# Patient Record
Sex: FEMALE | Race: BLACK OR AFRICAN AMERICAN | NOT HISPANIC OR LATINO | URBAN - METROPOLITAN AREA
[De-identification: names, ages, dates, MRNs, and addresses within clinical notes are randomized per-mention and may not be internally consistent; named-entity substitution may affect disease eponyms.]

---

## 2017-08-07 ENCOUNTER — EMERGENCY (EMERGENCY)
Facility: HOSPITAL | Age: 22
LOS: 1 days | Discharge: HOME | End: 2017-08-07
Attending: EMERGENCY MEDICINE
Payer: COMMERCIAL

## 2017-08-07 VITALS
DIASTOLIC BLOOD PRESSURE: 78 MMHG | OXYGEN SATURATION: 99 % | TEMPERATURE: 99 F | HEIGHT: 65 IN | WEIGHT: 139.99 LBS | RESPIRATION RATE: 20 BRPM | SYSTOLIC BLOOD PRESSURE: 128 MMHG | HEART RATE: 73 BPM

## 2017-08-07 PROCEDURE — 99283 EMERGENCY DEPT VISIT LOW MDM: CPT

## 2017-08-07 PROCEDURE — 72040 X-RAY EXAM NECK SPINE 2-3 VW: CPT | Mod: 26

## 2017-08-07 PROCEDURE — 72040 X-RAY EXAM NECK SPINE 2-3 VW: CPT

## 2017-08-07 PROCEDURE — 99283 EMERGENCY DEPT VISIT LOW MDM: CPT | Mod: 25

## 2017-08-07 RX ORDER — IBUPROFEN 200 MG
1 TABLET ORAL
Qty: 15 | Refills: 0
Start: 2017-08-07 | End: 2017-08-12

## 2017-08-07 RX ORDER — IBUPROFEN 200 MG
800 TABLET ORAL ONCE
Qty: 0 | Refills: 0 | Status: COMPLETED | OUTPATIENT
Start: 2017-08-07 | End: 2017-08-07

## 2017-08-07 RX ORDER — METHOCARBAMOL 500 MG/1
1 TABLET, FILM COATED ORAL
Qty: 15 | Refills: 0
Start: 2017-08-07 | End: 2017-08-12

## 2017-08-07 RX ADMIN — Medication 800 MILLIGRAM(S): at 10:32
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
## 2017-08-07 NOTE — ED STATDOCS - ATTENDING CONTRIBUTION TO CARE
I, Benito Groves, performed the initial face to face bedside interview with this patient regarding history of present illness, review of symptoms and relevant past medical, social and family history.  I completed an independent physical examination.  I was the provider who initially evaluated this patient.  The history, relevant review of systems, past medical and surgical history, medical decision making, and physical examination was documented by the scribe in my presence and I attest to the accuracy of the documentation. Follow-up on ordered tests (ie labs, radiologic studies) and re-evaluation of the patient's status has been communicated to the ACP.  Disposition of the patient will be based on test outcome and response to ED interventions.

## 2017-08-07 NOTE — ED STATDOCS - MUSCULOSKELETAL, MLM
C4-C5 midline tenderness, left trapezius tenderness. No T spine, L spine  midline tenderness. no para thoracic or para lumbar tenderness, no CVA tenderness. b/l UE no bony tenderness, no localized tenderness. b/l LE no bony tenderness, no localized tenderness.
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
## 2018-06-10 ENCOUNTER — TRANSCRIPTION ENCOUNTER (OUTPATIENT)
Age: 23
End: 2018-06-10

## 2019-10-15 PROBLEM — Z00.00 ENCOUNTER FOR PREVENTIVE HEALTH EXAMINATION: Status: ACTIVE | Noted: 2019-10-15

## 2019-11-25 ENCOUNTER — APPOINTMENT (OUTPATIENT)
Dept: OBGYN | Facility: CLINIC | Age: 24
End: 2019-11-25
Payer: OTHER GOVERNMENT

## 2019-11-25 ENCOUNTER — MED ADMIN CHARGE (OUTPATIENT)
Age: 24
End: 2019-11-25

## 2019-11-25 VITALS — WEIGHT: 184 LBS | DIASTOLIC BLOOD PRESSURE: 68 MMHG | SYSTOLIC BLOOD PRESSURE: 102 MMHG

## 2019-11-25 PROCEDURE — 0501F PRENATAL FLOW SHEET: CPT

## 2019-11-25 PROCEDURE — 90471 IMMUNIZATION ADMIN: CPT

## 2019-11-25 PROCEDURE — 90686 IIV4 VACC NO PRSV 0.5 ML IM: CPT

## 2019-12-04 ENCOUNTER — LABORATORY RESULT (OUTPATIENT)
Age: 24
End: 2019-12-04

## 2019-12-05 ENCOUNTER — APPOINTMENT (OUTPATIENT)
Dept: OBGYN | Facility: CLINIC | Age: 24
End: 2019-12-05
Payer: OTHER GOVERNMENT

## 2019-12-05 ENCOUNTER — APPOINTMENT (OUTPATIENT)
Dept: ANTEPARTUM | Facility: CLINIC | Age: 24
End: 2019-12-05
Payer: OTHER GOVERNMENT

## 2019-12-05 ENCOUNTER — ASOB RESULT (OUTPATIENT)
Age: 24
End: 2019-12-05

## 2019-12-05 VITALS
SYSTOLIC BLOOD PRESSURE: 113 MMHG | WEIGHT: 183 LBS | HEIGHT: 65 IN | BODY MASS INDEX: 30.49 KG/M2 | DIASTOLIC BLOOD PRESSURE: 73 MMHG

## 2019-12-05 PROCEDURE — 76819 FETAL BIOPHYS PROFIL W/O NST: CPT

## 2019-12-05 PROCEDURE — 76816 OB US FOLLOW-UP PER FETUS: CPT

## 2019-12-05 PROCEDURE — 0502F SUBSEQUENT PRENATAL CARE: CPT

## 2019-12-13 ENCOUNTER — APPOINTMENT (OUTPATIENT)
Dept: OBGYN | Facility: CLINIC | Age: 24
End: 2019-12-13
Payer: OTHER GOVERNMENT

## 2019-12-13 ENCOUNTER — TRANSCRIPTION ENCOUNTER (OUTPATIENT)
Age: 24
End: 2019-12-13

## 2019-12-13 VITALS
DIASTOLIC BLOOD PRESSURE: 74 MMHG | HEIGHT: 65 IN | SYSTOLIC BLOOD PRESSURE: 111 MMHG | BODY MASS INDEX: 30.99 KG/M2 | WEIGHT: 186 LBS

## 2019-12-13 PROCEDURE — 0502F SUBSEQUENT PRENATAL CARE: CPT

## 2019-12-14 ENCOUNTER — LABORATORY RESULT (OUTPATIENT)
Age: 24
End: 2019-12-14

## 2019-12-18 ENCOUNTER — APPOINTMENT (OUTPATIENT)
Dept: OBGYN | Facility: CLINIC | Age: 24
End: 2019-12-18
Payer: OTHER GOVERNMENT

## 2019-12-18 VITALS
DIASTOLIC BLOOD PRESSURE: 70 MMHG | BODY MASS INDEX: 31.65 KG/M2 | WEIGHT: 190 LBS | SYSTOLIC BLOOD PRESSURE: 110 MMHG | HEIGHT: 65 IN

## 2019-12-18 PROCEDURE — 0502F SUBSEQUENT PRENATAL CARE: CPT

## 2019-12-26 ENCOUNTER — APPOINTMENT (OUTPATIENT)
Dept: OBGYN | Facility: CLINIC | Age: 24
End: 2019-12-26
Payer: OTHER GOVERNMENT

## 2019-12-26 VITALS
SYSTOLIC BLOOD PRESSURE: 110 MMHG | HEIGHT: 65 IN | DIASTOLIC BLOOD PRESSURE: 70 MMHG | WEIGHT: 192 LBS | BODY MASS INDEX: 31.99 KG/M2

## 2019-12-26 PROCEDURE — 0502F SUBSEQUENT PRENATAL CARE: CPT

## 2019-12-26 PROCEDURE — 59425 ANTEPARTUM CARE ONLY: CPT

## 2019-12-30 ENCOUNTER — TRANSCRIPTION ENCOUNTER (OUTPATIENT)
Age: 24
End: 2019-12-30

## 2020-01-01 ENCOUNTER — INPATIENT (INPATIENT)
Facility: HOSPITAL | Age: 25
LOS: 2 days | Discharge: HOME | End: 2020-01-04
Payer: OTHER GOVERNMENT

## 2020-01-01 VITALS — HEART RATE: 82 BPM | DIASTOLIC BLOOD PRESSURE: 66 MMHG | TEMPERATURE: 98 F | SYSTOLIC BLOOD PRESSURE: 123 MMHG

## 2020-01-01 DIAGNOSIS — O26.893 OTHER SPECIFIED PREGNANCY RELATED CONDITIONS, THIRD TRIMESTER: ICD-10-CM

## 2020-01-01 DIAGNOSIS — Z3A.39 39 WEEKS GESTATION OF PREGNANCY: ICD-10-CM

## 2020-01-01 LAB
ABO RH CONFIRMATION: SIGNIFICANT CHANGE UP
APPEARANCE UR: CLEAR — SIGNIFICANT CHANGE UP
BACTERIA # UR AUTO: ABNORMAL
BASOPHILS # BLD AUTO: 0.02 K/UL — SIGNIFICANT CHANGE UP (ref 0–0.2)
BASOPHILS NFR BLD AUTO: 0.2 % — SIGNIFICANT CHANGE UP (ref 0–2)
BILIRUB UR-MCNC: NEGATIVE — SIGNIFICANT CHANGE UP
BLD GP AB SCN SERPL QL: SIGNIFICANT CHANGE UP
COLOR SPEC: YELLOW — SIGNIFICANT CHANGE UP
DIFF PNL FLD: NEGATIVE — SIGNIFICANT CHANGE UP
EOSINOPHIL # BLD AUTO: 0.1 K/UL — SIGNIFICANT CHANGE UP (ref 0–0.5)
EOSINOPHIL NFR BLD AUTO: 1.2 % — SIGNIFICANT CHANGE UP (ref 0–6)
EPI CELLS # UR: SIGNIFICANT CHANGE UP
GLUCOSE UR QL: NEGATIVE MG/DL — SIGNIFICANT CHANGE UP
HCT VFR BLD CALC: 31.4 % — LOW (ref 34.5–45)
HGB BLD-MCNC: 10.5 G/DL — LOW (ref 11.5–15.5)
IMM GRANULOCYTES NFR BLD AUTO: 0.5 % — SIGNIFICANT CHANGE UP (ref 0–1.5)
KETONES UR-MCNC: NEGATIVE — SIGNIFICANT CHANGE UP
LEUKOCYTE ESTERASE UR-ACNC: NEGATIVE — SIGNIFICANT CHANGE UP
LYMPHOCYTES # BLD AUTO: 1.31 K/UL — SIGNIFICANT CHANGE UP (ref 1–3.3)
LYMPHOCYTES # BLD AUTO: 15.7 % — SIGNIFICANT CHANGE UP (ref 13–44)
MCHC RBC-ENTMCNC: 30.7 PG — SIGNIFICANT CHANGE UP (ref 27–34)
MCHC RBC-ENTMCNC: 33.4 GM/DL — SIGNIFICANT CHANGE UP (ref 32–36)
MCV RBC AUTO: 91.8 FL — SIGNIFICANT CHANGE UP (ref 80–100)
MONOCYTES # BLD AUTO: 0.87 K/UL — SIGNIFICANT CHANGE UP (ref 0–0.9)
MONOCYTES NFR BLD AUTO: 10.4 % — SIGNIFICANT CHANGE UP (ref 2–14)
NEUTROPHILS # BLD AUTO: 6.03 K/UL — SIGNIFICANT CHANGE UP (ref 1.8–7.4)
NEUTROPHILS NFR BLD AUTO: 72 % — SIGNIFICANT CHANGE UP (ref 43–77)
NITRITE UR-MCNC: NEGATIVE — SIGNIFICANT CHANGE UP
PH UR: 7 — SIGNIFICANT CHANGE UP (ref 5–8)
PLATELET # BLD AUTO: 225 K/UL — SIGNIFICANT CHANGE UP (ref 150–400)
PROT UR-MCNC: 15 MG/DL
RBC # BLD: 3.42 M/UL — LOW (ref 3.8–5.2)
RBC # FLD: 12.7 % — SIGNIFICANT CHANGE UP (ref 10.3–14.5)
RBC CASTS # UR COMP ASSIST: NEGATIVE /HPF — SIGNIFICANT CHANGE UP (ref 0–4)
SP GR SPEC: 1.01 — SIGNIFICANT CHANGE UP (ref 1.01–1.02)
UROBILINOGEN FLD QL: NEGATIVE MG/DL — SIGNIFICANT CHANGE UP
WBC # BLD: 8.37 K/UL — SIGNIFICANT CHANGE UP (ref 3.8–10.5)
WBC # FLD AUTO: 8.37 K/UL — SIGNIFICANT CHANGE UP (ref 3.8–10.5)
WBC UR QL: SIGNIFICANT CHANGE UP

## 2020-01-01 RX ORDER — SODIUM CHLORIDE 9 MG/ML
1000 INJECTION, SOLUTION INTRAVENOUS
Refills: 0 | Status: DISCONTINUED | OUTPATIENT
Start: 2020-01-01 | End: 2020-01-02

## 2020-01-01 RX ORDER — OXYTOCIN 10 UNIT/ML
333.33 VIAL (ML) INJECTION
Qty: 20 | Refills: 0 | Status: COMPLETED | OUTPATIENT
Start: 2020-01-01 | End: 2020-01-02

## 2020-01-01 RX ORDER — CITRIC ACID/SODIUM CITRATE 300-500 MG
30 SOLUTION, ORAL ORAL ONCE
Refills: 0 | Status: COMPLETED | OUTPATIENT
Start: 2020-01-01 | End: 2020-01-02

## 2020-01-01 RX ADMIN — SODIUM CHLORIDE 125 MILLILITER(S): 9 INJECTION, SOLUTION INTRAVENOUS at 20:56

## 2020-01-01 NOTE — OB RN PATIENT PROFILE - IF RESULT GREATER THAN 35 DAYS OLD SELECT STATUS
"Encounter Date: 2/15/2018    ED Physician Progress Notes        Physician Note:   I evaluated the patient in triage and performed medical screening exam. Patient stable at this time and awaiting bed. Chief complaint and vital signs reviewed.    Pt hx no signficant medical hx and presents to the ED with two weeks of intermittent "chest discomfort."  Reports pain to be dull, squeezing sensation.  States when this happens, he has palpitations and shortness of breath.  Denies recent travel, surgery, previous blood clot, lower extremity edema, or exogenous hormones.  Denies drug use.  No CAD risk factors.  Will obtain basic labs, TSH, EKG, and chest xray.  Will send to Main ED for further eval.     " N/A

## 2020-01-01 NOTE — OB PROVIDER IHI INDUCTION/AUGMENTATION NOTE - NS_CHECKALL_OBGYN_ALL_OB
Order was written/Induction / Augmentation was discussed/FHR was reviewed/H&P was completed/Contractions pattern was reviewed

## 2020-01-01 NOTE — OB PROVIDER H&P - ASSESSMENT
23yo  at 39 6/7 weeks gestation admitted for eIOL    - Admit to L&D  - Admission labs sent  - Will begin induction with cytotec    Discussed with Dr. Arambula

## 2020-01-01 NOTE — OB PROVIDER H&P - HISTORY OF PRESENT ILLNESS
Patient is a 23yo  at 39 6/7 weeks gestation presenting to L&D for [facility]    Patient reports occasional contractions and good fetal movement. She denies vaginal bleeding and leakage of fluid.    This pregnancy has been uncomplicated    OBHx: denies  PMH: denies  PSH: denies  Meds: PNV, anemia, vitamin E  All: NKDA

## 2020-01-01 NOTE — OB PROVIDER H&P - NSHPPHYSICALEXAM_GEN_ALL_CORE
Vital Signs Last 24 Hrs  T(C): 36.4 (01 Jan 2020 20:38), Max: 36.4 (01 Jan 2020 20:38)  T(F): 97.52 (01 Jan 2020 20:38), Max: 97.52 (01 Jan 2020 20:38)  HR: 82 (01 Jan 2020 20:38) (82 - 82)  BP: 123/66 (01 Jan 2020 20:38) (123/66 - 123/66)      SVE: 1-2/60/-3  [place]: cephalic  FHT: baseline 145, moderate variability, + accels, - decels  [place]: occasional contractions

## 2020-01-02 ENCOUNTER — TRANSCRIPTION ENCOUNTER (OUTPATIENT)
Age: 25
End: 2020-01-02

## 2020-01-02 LAB — T PALLIDUM AB TITR SER: NEGATIVE — SIGNIFICANT CHANGE UP

## 2020-01-02 PROCEDURE — 59515 CESAREAN DELIVERY: CPT

## 2020-01-02 RX ORDER — OXYTOCIN 10 UNIT/ML
2 VIAL (ML) INJECTION
Qty: 30 | Refills: 0 | Status: DISCONTINUED | OUTPATIENT
Start: 2020-01-02 | End: 2020-01-04

## 2020-01-02 RX ORDER — OXYTOCIN 10 UNIT/ML
41.67 VIAL (ML) INJECTION
Qty: 20 | Refills: 0 | Status: COMPLETED | OUTPATIENT
Start: 2020-01-02 | End: 2020-01-02

## 2020-01-02 RX ORDER — SIMETHICONE 80 MG/1
80 TABLET, CHEWABLE ORAL EVERY 4 HOURS
Refills: 0 | Status: DISCONTINUED | OUTPATIENT
Start: 2020-01-02 | End: 2020-01-04

## 2020-01-02 RX ORDER — MAGNESIUM HYDROXIDE 400 MG/1
30 TABLET, CHEWABLE ORAL
Refills: 0 | Status: DISCONTINUED | OUTPATIENT
Start: 2020-01-02 | End: 2020-01-04

## 2020-01-02 RX ORDER — SODIUM CHLORIDE 9 MG/ML
1000 INJECTION, SOLUTION INTRAVENOUS
Refills: 0 | Status: DISCONTINUED | OUTPATIENT
Start: 2020-01-02 | End: 2020-01-04

## 2020-01-02 RX ORDER — GLYCERIN ADULT
1 SUPPOSITORY, RECTAL RECTAL AT BEDTIME
Refills: 0 | Status: DISCONTINUED | OUTPATIENT
Start: 2020-01-02 | End: 2020-01-04

## 2020-01-02 RX ORDER — ACETAMINOPHEN 500 MG
975 TABLET ORAL
Refills: 0 | Status: DISCONTINUED | OUTPATIENT
Start: 2020-01-02 | End: 2020-01-04

## 2020-01-02 RX ORDER — DIPHENHYDRAMINE HCL 50 MG
25 CAPSULE ORAL EVERY 6 HOURS
Refills: 0 | Status: DISCONTINUED | OUTPATIENT
Start: 2020-01-02 | End: 2020-01-04

## 2020-01-02 RX ORDER — SODIUM CHLORIDE 9 MG/ML
3 INJECTION INTRAMUSCULAR; INTRAVENOUS; SUBCUTANEOUS EVERY 8 HOURS
Refills: 0 | Status: DISCONTINUED | OUTPATIENT
Start: 2020-01-02 | End: 2020-01-04

## 2020-01-02 RX ORDER — KETOROLAC TROMETHAMINE 30 MG/ML
30 SYRINGE (ML) INJECTION ONCE
Refills: 0 | Status: DISCONTINUED | OUTPATIENT
Start: 2020-01-02 | End: 2020-01-04

## 2020-01-02 RX ORDER — OXYCODONE HYDROCHLORIDE 5 MG/1
5 TABLET ORAL
Refills: 0 | Status: DISCONTINUED | OUTPATIENT
Start: 2020-01-02 | End: 2020-01-04

## 2020-01-02 RX ORDER — LANOLIN
1 OINTMENT (GRAM) TOPICAL EVERY 6 HOURS
Refills: 0 | Status: DISCONTINUED | OUTPATIENT
Start: 2020-01-02 | End: 2020-01-04

## 2020-01-02 RX ORDER — HYDROCORTISONE 1 %
1 OINTMENT (GRAM) TOPICAL EVERY 6 HOURS
Refills: 0 | Status: DISCONTINUED | OUTPATIENT
Start: 2020-01-02 | End: 2020-01-04

## 2020-01-02 RX ORDER — TETANUS TOXOID, REDUCED DIPHTHERIA TOXOID AND ACELLULAR PERTUSSIS VACCINE, ADSORBED 5; 2.5; 8; 8; 2.5 [IU]/.5ML; [IU]/.5ML; UG/.5ML; UG/.5ML; UG/.5ML
0.5 SUSPENSION INTRAMUSCULAR ONCE
Refills: 0 | Status: DISCONTINUED | OUTPATIENT
Start: 2020-01-02 | End: 2020-01-04

## 2020-01-02 RX ORDER — OXYCODONE HYDROCHLORIDE 5 MG/1
5 TABLET ORAL ONCE
Refills: 0 | Status: DISCONTINUED | OUTPATIENT
Start: 2020-01-02 | End: 2020-01-04

## 2020-01-02 RX ORDER — DIBUCAINE 1 %
1 OINTMENT (GRAM) RECTAL EVERY 6 HOURS
Refills: 0 | Status: DISCONTINUED | OUTPATIENT
Start: 2020-01-02 | End: 2020-01-04

## 2020-01-02 RX ORDER — PRAMOXINE HYDROCHLORIDE 150 MG/15G
1 AEROSOL, FOAM RECTAL EVERY 4 HOURS
Refills: 0 | Status: DISCONTINUED | OUTPATIENT
Start: 2020-01-02 | End: 2020-01-04

## 2020-01-02 RX ORDER — IBUPROFEN 200 MG
600 TABLET ORAL EVERY 6 HOURS
Refills: 0 | Status: COMPLETED | OUTPATIENT
Start: 2020-01-02 | End: 2020-11-30

## 2020-01-02 RX ORDER — BENZOCAINE 10 %
1 GEL (GRAM) MUCOUS MEMBRANE EVERY 6 HOURS
Refills: 0 | Status: DISCONTINUED | OUTPATIENT
Start: 2020-01-02 | End: 2020-01-04

## 2020-01-02 RX ORDER — AER TRAVELER 0.5 G/1
1 SOLUTION RECTAL; TOPICAL EVERY 4 HOURS
Refills: 0 | Status: DISCONTINUED | OUTPATIENT
Start: 2020-01-02 | End: 2020-01-04

## 2020-01-02 RX ADMIN — Medication 1000 MILLIUNIT(S)/MIN: at 20:10

## 2020-01-02 RX ADMIN — Medication 125 MILLIUNIT(S)/MIN: at 20:10

## 2020-01-02 RX ADMIN — SODIUM CHLORIDE 125 MILLILITER(S): 9 INJECTION, SOLUTION INTRAVENOUS at 16:03

## 2020-01-02 RX ADMIN — SODIUM CHLORIDE 125 MILLILITER(S): 9 INJECTION, SOLUTION INTRAVENOUS at 08:25

## 2020-01-02 RX ADMIN — Medication 30 MILLILITER(S): at 14:08

## 2020-01-02 RX ADMIN — Medication 2 MILLIUNIT(S)/MIN: at 17:03
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
## 2020-01-02 NOTE — CHART NOTE - NSCHARTNOTEFT_GEN_A_CORE
Patient comfortably resting with epidural. Currently on d5LR because was       Vital Signs Last 24 Hrs  T(C): 36.6 (02 Jan 2020 09:27), Max: 36.6 (02 Jan 2020 05:02)  T(F): 97.88 (02 Jan 2020 09:27), Max: 97.88 (02 Jan 2020 05:02)  HR: 96 (02 Jan 2020 16:12) (68 - 96)  BP: 97/59 (02 Jan 2020 16:02) (92/52 - 123/66)  BP(mean): --  RR: 18 (02 Jan 2020 00:22) (16 - 18)  SpO2: 100% (02 Jan 2020 16:07) (99% - 100%)    FETAL HEART RATE:145/mod evy/ +accels/ evy decel  [place]:q 2-6 min    CERVICAL EXAM: 6/100/-1      IMPRESSION: making good cervical change. FHT improved with d5, cat II will continue to monitor. Will start pitocin for increased regularity of contractions.     discussed with Dr. Ureña

## 2020-01-02 NOTE — CHART NOTE - NSCHARTNOTEFT_GEN_A_CORE
Patient resting comfortably. She declines pain management at this time.    Vital Signs Last 24 Hrs  T(C): 36.5 (02 Jan 2020 00:59), Max: 36.5 (02 Jan 2020 00:22)  T(F): 97.7 (02 Jan 2020 00:59), Max: 97.7 (02 Jan 2020 00:22)  HR: 83 (02 Jan 2020 03:03) (82 - 83)  BP: 112/69 (02 Jan 2020 03:03) (112/69 - 123/66)  RR: 18 (02 Jan 2020 00:22) (16 - 18)    FHT: baseline 145, moderate variability, + accels, - decels  [place]: intermittent contractions    - Patient due for 1st dose of 40mcg PO cytotec at 0320  - Will continue current management

## 2020-01-02 NOTE — CHART NOTE - NSCHARTNOTEFT_GEN_A_CORE
Patient wants an epidural.     Vital Signs Last 24 Hrs  T(C): 36.6 (02 Jan 2020 05:02), Max: 36.6 (02 Jan 2020 05:02)  T(F): 97.88 (02 Jan 2020 05:02), Max: 97.88 (02 Jan 2020 05:02)  HR: 90 (02 Jan 2020 10:07) (80 - 90)  BP: 96/53 (02 Jan 2020 10:07) (96/53 - 123/66)  BP(mean): --  RR: 18 (02 Jan 2020 00:22) (16 - 18)  SpO2: --    FETAL HEART RATE:145/mod evy/+accels  [place]:irregular     CERVICAL EXAM:1.5/80/-2      IMPRESSION: Making cervical change on  cytotec. will get epidural and continue cytotec for a few more doses.       discussed with Dr. Ureña

## 2020-01-02 NOTE — OB RN DELIVERY SUMMARY - NS_SEPSISRSKCALC_OBGYN_ALL_OB_FT
EOS calculated successfully. EOS Risk Factor: 0.5/1000 live births ([facility] national incidence); GA=40w;Temp=98.6; ROM=1.7; GBS='Negative'; Antibiotics='No antibiotics or any antibiotics < 2 hrs prior to birth'

## 2020-01-02 NOTE — CHART NOTE - NSCHARTNOTEFT_GEN_A_CORE
pt comfortable w epidural.   ve 10/100/0 sta.   fht- had some variable decels, recovered w o2, position changes. ise placed.   pitocin restarted agter 20 minutes of reassuring fht.

## 2020-01-02 NOTE — OB PROVIDER DELIVERY SUMMARY - NSPROVIDERDELIVERYNOTE_OBGYN_ALL_OB_FT
after fd and pushing pt del viable female infant over intact perineum. direct op. apgars 9,9. first deg periurethral lac rep w 3.0 chromic. plac del spont/3vc/intact. no complications. peds at [facility] for thick mec.

## 2020-01-03 LAB
HCT VFR BLD CALC: 29.7 % — LOW (ref 34.5–45)
HGB BLD-MCNC: 9.7 G/DL — LOW (ref 11.5–15.5)

## 2020-01-03 RX ORDER — IBUPROFEN 200 MG
600 TABLET ORAL EVERY 6 HOURS
Refills: 0 | Status: DISCONTINUED | OUTPATIENT
Start: 2020-01-03 | End: 2020-01-04

## 2020-01-03 RX ADMIN — Medication 975 MILLIGRAM(S): at 02:59

## 2020-01-03 RX ADMIN — Medication 600 MILLIGRAM(S): at 13:48

## 2020-01-03 RX ADMIN — Medication 1 TABLET(S): at 13:21

## 2020-01-03 RX ADMIN — Medication 975 MILLIGRAM(S): at 09:59

## 2020-01-03 RX ADMIN — Medication 600 MILLIGRAM(S): at 13:18

## 2020-01-03 RX ADMIN — Medication 975 MILLIGRAM(S): at 10:30

## 2020-01-03 RX ADMIN — SODIUM CHLORIDE 3 MILLILITER(S): 9 INJECTION INTRAMUSCULAR; INTRAVENOUS; SUBCUTANEOUS at 06:21

## 2020-01-03 RX ADMIN — Medication 600 MILLIGRAM(S): at 17:52

## 2020-01-03 RX ADMIN — Medication 975 MILLIGRAM(S): at 03:59

## 2020-01-03 RX ADMIN — Medication 600 MILLIGRAM(S): at 18:22

## 2020-01-03 NOTE — DISCHARGE NOTE OB - PATIENT PORTAL LINK FT
You can access the FollowMyHealth Patient Portal offered by [facility] by registering at the following website: [email or web address]. By joining [employer]’s FollowMyHealth portal, you will also be able to view your health information using other applications (apps) compatible with our system.

## 2020-01-03 NOTE — DISCHARGE NOTE OB - HOSPITAL COURSE
S/P . Uncomplicated hospital course. At the time of discharge patient was tolerating a regular diet, ambulating without assistance, voiding spontaneously and pain was well controlled with PRN medications. Patient aware of plan to follow up with her OB 4-6 weeks after discharge.

## 2020-01-03 NOTE — PROGRESS NOTE ADULT - SUBJECTIVE AND OBJECTIVE BOX
CHIOMA YANG is a 24y  s/p  PPD1 of a viable female infant.     SUBJECTIVE:  Patient complains of painful bump around vagina.  Pain is well controlled with PRN pain medication.   She is ambulating, voiding, tolerating PO. Denies N/V.  -flatus / -BM.   minimal lochia.      OBJECTIVE:  Physical exam:  General: AOx3, NAD.  Abdomen: +BS, Soft, appropriately tender to palpitation, firm uterine fundus at umbilicus.  Pelvic: Small 5mm x 5mm bump on R inner thigh crease in area of pubic hair, no drainage  Ext: No DVT signs, warm extremities.    Vital Signs Last 24 Hrs  T(C): 36.8 (2020 22:20), Max: 37.6 (2020 20:05)  T(F): 98.3 (2020 22:20), Max: 99.7 (2020 20:05)  HR: 79 (2020 22:20) (68 - 122)  BP: 112/70 (2020 22:20) (92/52 - 131/67)  RR: 18 (2020 22:20) (18 - 18)  SpO2: 100% (2020 22:20) (89% - 100%)    LABS:                        10.5   8.37  )-----------( 225      ( 2020 21:06 )             31.4

## 2020-01-03 NOTE — DISCHARGE NOTE OB - MATERIALS PROVIDED
Immunization Record/Breastfeeding Log/Back To Sleep Handout/Birth Certificate Instructions/Discharge Medication Information for Patients and Families Pocket Guide/Breastfeeding Mother’s Support Group Information/Guide to Postpartum Care/Vaccinations/Health system  Screening Program/Shaken Baby Prevention Handout/Health system Hearing Screen Program/Breastfeeding Guide and Packet

## 2020-01-03 NOTE — PROGRESS NOTE ADULT - PROBLEM SELECTOR PLAN 1
- Stable  - Bump likely ingrown hair  - Hgb 10.5 -> f/u postpartum H&H  - Pain: well controlled on PO pain meds  - GI: Regular diet  - : voiding  - DVT prophylaxis: ambulate  - Dispo: PPD 2, unless otherwise specified

## 2020-01-03 NOTE — DISCHARGE NOTE OB - CARE PROVIDER_API CALL
Junior Zepeda)  Obstetrics and Gynecology  [address] [place] [address]  Phone: [phone]  Fax: [phone]  Follow Up Time:

## 2020-01-03 NOTE — DISCHARGE NOTE OB - MEDICATION SUMMARY - MEDICATIONS TO CHANGE
I will SWITCH the dose or number of times a day I take the medications listed below when I get home from the hospital:     mg oral tablet  -- 1 tab(s) by mouth every 8 hours, with food  -- Do not take this drug if you are pregnant.  It is very important that you take or use this exactly as directed.  Do not skip doses or discontinue unless directed by your doctor.  May cause drowsiness or dizziness.  Obtain medical advice before taking any non-prescription drugs as some may affect the action of this medication.  Take with food or milk.

## 2020-01-03 NOTE — DISCHARGE NOTE OB - MEDICATION SUMMARY - MEDICATIONS TO STOP TAKING
I will STOP taking the medications listed below when I get home from the hospital:    methocarbamol 750 mg oral tablet  -- 1 tab(s) by mouth 3 times a day  -- May cause drowsiness.  Alcohol may intensify this effect.  Use care when operating dangerous machinery.

## 2020-01-04 VITALS
SYSTOLIC BLOOD PRESSURE: 105 MMHG | TEMPERATURE: 98 F | DIASTOLIC BLOOD PRESSURE: 61 MMHG | RESPIRATION RATE: 18 BRPM | HEART RATE: 91 BPM

## 2020-01-04 PROCEDURE — 86900 BLOOD TYPING SEROLOGIC ABO: CPT

## 2020-01-04 PROCEDURE — 86850 RBC ANTIBODY SCREEN: CPT

## 2020-01-04 PROCEDURE — 85018 HEMOGLOBIN: CPT

## 2020-01-04 PROCEDURE — 86780 TREPONEMA PALLIDUM: CPT

## 2020-01-04 PROCEDURE — 85027 COMPLETE CBC AUTOMATED: CPT

## 2020-01-04 PROCEDURE — 86901 BLOOD TYPING SEROLOGIC RH(D): CPT

## 2020-01-04 PROCEDURE — 85014 HEMATOCRIT: CPT

## 2020-01-04 PROCEDURE — 81001 URINALYSIS AUTO W/SCOPE: CPT

## 2020-01-04 PROCEDURE — 36415 COLL VENOUS BLD VENIPUNCTURE: CPT

## 2020-01-04 PROCEDURE — 59050 FETAL MONITOR W/REPORT: CPT

## 2020-01-04 RX ORDER — IBUPROFEN 200 MG
1 TABLET ORAL
Qty: 28 | Refills: 0
Start: 2020-01-04 | End: 2020-01-10

## 2020-01-04 RX ADMIN — Medication 600 MILLIGRAM(S): at 06:28

## 2020-01-04 RX ADMIN — Medication 600 MILLIGRAM(S): at 05:28

## 2020-01-04 RX ADMIN — SODIUM CHLORIDE 3 MILLILITER(S): 9 INJECTION INTRAMUSCULAR; INTRAVENOUS; SUBCUTANEOUS at 00:51

## 2020-01-04 NOTE — PROGRESS NOTE ADULT - SUBJECTIVE AND OBJECTIVE BOX
Postpartum Note Vaginal Delivery  Patient is a 25yo  s/p  at 40w day 2.    Subjective:  No acute events overnight.   Patient is tolerating diet and denies N/V.   Patient still has slight vaginal bleeding which is decreasing in amount.   She is breastfeeding and the baby is latching on.    Urinating appropriately.   +BM/+flatus.    Physical exam:  Vital Signs Last 24 Hrs  T(C): 36.7 (2020 20:00), Max: 36.7 (2020 20:00)  T(F): 98 (2020 20:00), Max: 98 (2020 20:00)  HR: 94 (2020 20:00) (80 - 94)  BP: 105/62 (2020 20:00) (103/66 - 105/62)  RR: 18 (2020 20:00) (18 - 18)  SpO2: 99% (2020 20:00) (99% - 99%)    Heart: RRR  Lungs: CTABL  Breast: non tender, not engorged   Abdomen: Soft, nontender, no distension, firm uterine fundus  Ext: No DVT signs, warm extremities    LABS:                        9.7    x     )-----------( x        ( 2020 06:33 )             29.7

## 2020-01-04 NOTE — PROGRESS NOTE ADULT - PROBLEM SELECTOR PLAN 1
Continue the current pain medication.   Encourage ambulation and a regular diet.   Encourage mother baby bonding.  : voiding spontaneously.  GI: +flatus, stool softeners PRN.  DVTppx: ambulation.  Tentative d/c planning for PPD#2-3.

## 2020-01-06 ENCOUNTER — INBOUND DOCUMENT (OUTPATIENT)
Age: 25
End: 2020-01-06

## 2020-02-12 ENCOUNTER — APPOINTMENT (OUTPATIENT)
Dept: OBGYN | Facility: CLINIC | Age: 25
End: 2020-02-12
Payer: OTHER GOVERNMENT

## 2020-02-12 VITALS
HEART RATE: 91 BPM | HEIGHT: 65 IN | SYSTOLIC BLOOD PRESSURE: 101 MMHG | WEIGHT: 182 LBS | DIASTOLIC BLOOD PRESSURE: 69 MMHG | BODY MASS INDEX: 30.32 KG/M2

## 2020-02-12 DIAGNOSIS — Z01.419 ENCOUNTER FOR GYNECOLOGICAL EXAMINATION (GENERAL) (ROUTINE) W/OUT ABNORMAL FINDINGS: ICD-10-CM

## 2020-02-12 PROCEDURE — 58300 INSERT INTRAUTERINE DEVICE: CPT

## 2020-02-12 NOTE — HISTORY OF PRESENT ILLNESS
[Postpartum Follow Up] : postpartum follow up [de-identified] : denies depression. [Complications:___] : no complications [] : delivered by vaginal delivery [BreastFeeding Problems] : breastfeeding problems [Breastfeeding] : not currently nursing [S/Sx PP Depression] : no signs/symptoms of postpartum depression [Erythema] : not erythematous [Back to Normal] : is back to normal in size [Mild] : mild vaginal bleeding [Normal] : the vagina was normal [Cervix Sample Taken] : cervical sample not taken for a Pap smear [Doing Well] : is doing well [Not Done] : Examination of breasts not done [No Sign of Infection] : is showing no signs of infection [Excellent Pain Control] : has excellent pain control [None] : None [de-identified] : denies depression

## 2020-02-12 NOTE — HISTORY OF PRESENT ILLNESS
[de-identified] : denies depression. [Postpartum Follow Up] : postpartum follow up [] : delivered by vaginal delivery [Complications:___] : no complications [Breastfeeding] : not currently nursing [BreastFeeding Problems] : breastfeeding problems [S/Sx PP Depression] : no signs/symptoms of postpartum depression [Erythema] : not erythematous [Back to Normal] : is back to normal in size [Mild] : mild vaginal bleeding [Cervix Sample Taken] : cervical sample not taken for a Pap smear [Normal] : the vagina was normal [Doing Well] : is doing well [Not Done] : Examination of breasts not done [Excellent Pain Control] : has excellent pain control [No Sign of Infection] : is showing no signs of infection [de-identified] : denies depression [None] : None

## 2020-02-12 NOTE — PROCEDURE
[Prevention of Pregnancy] : prevention of pregnancy [Risks] : risks [IUD Placement] : intrauterine device (IUD) placement [Alternatives] : alternatives [Patient] : patient [Benefits] : benefits [Pain] : pain [Bleeding] : bleeding [Infection] : infection [Expulsion] : expulsion [Failure] : failure [Neg Pregnancy Test] : a pregnancy test was negative [Uterine Perforation] : uterine perforation [CONSENT OBTAINED] : written consent was obtained prior to the procedure. [1%] : 1% [Betadine] : Prepped with Betadine [Paracervical Block] : A paracervical block was performed using [Without Epi] : without epinephrine [Tenaculum] : a single toothed tenaculum [Mirena IUD] : The Mirena IUD was inserted past the internal cervical os. The IUD was then gently inserted upwards toward the fundus.  The IUD strings were cut to an appropriate length. [No Complications] : there were no complications [Tolerated Well] : the patient tolerated the procedure well

## 2020-02-12 NOTE — PROCEDURE
[Prevention of Pregnancy] : prevention of pregnancy [IUD Placement] : intrauterine device (IUD) placement [Risks] : risks [Alternatives] : alternatives [Benefits] : benefits [Patient] : patient [Bleeding] : bleeding [Pain] : pain [Infection] : infection [Expulsion] : expulsion [Failure] : failure [Neg Pregnancy Test] : a pregnancy test was negative [CONSENT OBTAINED] : written consent was obtained prior to the procedure. [Uterine Perforation] : uterine perforation [Paracervical Block] : A paracervical block was performed using [Betadine] : Prepped with Betadine [1%] : 1% [Tenaculum] : a single toothed tenaculum [Without Epi] : without epinephrine [Mirena IUD] : The Mirena IUD was inserted past the internal cervical os. The IUD was then gently inserted upwards toward the fundus.  The IUD strings were cut to an appropriate length. [No Complications] : there were no complications [Tolerated Well] : the patient tolerated the procedure well

## 2020-02-26 LAB
C TRACH RRNA SPEC QL NAA+PROBE: NOT DETECTED
N GONORRHOEA RRNA SPEC QL NAA+PROBE: NOT DETECTED
SOURCE AMPLIFICATION: NORMAL

## 2020-03-13 DIAGNOSIS — Z30.430 ENCOUNTER FOR INSERTION OF INTRAUTERINE CONTRACEPTIVE DEVICE: ICD-10-CM

## 2020-03-13 RX ORDER — COPPER 313.4 MG/1
INTRAUTERINE DEVICE INTRAUTERINE
Refills: 0 | Status: COMPLETED | OUTPATIENT
Start: 2020-03-13

## 2021-03-07 ENCOUNTER — TRANSCRIPTION ENCOUNTER (OUTPATIENT)
Age: 26
End: 2021-03-07

## 2021-03-15 NOTE — OB RN PATIENT PROFILE - PRO ROOMING IN [facility] OB
Left message for patient to return call to clinic.    Jason Prater RN....3/15/2021 2:28 PM       
Patient returned the call and was informed of Dr. Del Cid's message below. She was provided with the [facility] phone number and will call to schedule an appointment.    Jason JESUS RN....3/15/2021 3:18 PM     
RN: Please call to notify Alannah Leos that infection screening is negative.  Therefore, I have placed orders for Remicade to be received at the [place] infusion center.  Infusions will be on week 0, 2, 6, and then every 8 weeks.  Please provide her with the phone number so that she may call to schedule.  Also notify her that pre-infusion COVID19 testing is required and she will be contacted by central scheduling to have this arranged.       Remicade 200mg per infusion (~4mg/kg)    Merrick Del Cid MD  3/15/2021 8:11 AM    
yes

## 2023-10-16 NOTE — OB PROVIDER H&P - BIRTH SEX
Received request via: Pharmacy    Was the patient seen in the last year in this department? Yes    Does the patient have an active prescription (recently filled or refills available) for medication(s) requested? No    Does the patient have alf Plus and need 100 day supply (blood pressure, diabetes and cholesterol meds only)? Patient does not have SCP  
Female
